# Patient Record
(demographics unavailable — no encounter records)

---

## 2025-07-19 NOTE — PHYSICAL EXAM
[MA] : MA [FreeTextEntry2] : Kishore [Appropriately responsive] : appropriately responsive [Alert] : alert [No Acute Distress] : no acute distress [No Lymphadenopathy] : no lymphadenopathy [Soft] : soft [Non-tender] : non-tender [Non-distended] : non-distended [Oriented x3] : oriented x3 [Examination Of The Breasts] : a normal appearance [No Masses] : no breast masses were palpable [Labia Majora] : normal [Labia Minora] : normal [Normal] : normal [Uterine Adnexae] : normal

## 2025-07-19 NOTE — HISTORY OF PRESENT ILLNESS
[FreeTextEntry1] : CC: Annual checkup. No c/o. HPI: LMP:    POBGYN:    x 1 H/o Mild cervical dysplasia. Pap 2019 HPV HR+   PAP: [id number] Mammo:

## 2025-07-30 NOTE — PLAN
[FreeTextEntry1] : Abnl pap, HPV infection d/w pt.  No AW lesion on cx on colposcopy Will call pt w/ ECC result. Pap/cotesting in 1 yr if ECC neg.

## 2025-07-30 NOTE — PROCEDURE
[Colposcopy] : Colposcopy  [Risks] : risks [Patient] : patient [Abnormal Pap] : abnormal pap [HPV High Risk] : HPV high risk [No Premedication] : no premedication [Colposcopy Adequate] : colposcopy adequate [Pap Performed] : pap not performed [SCI Fully Visualized] : SCI fully visualized [ECC Performed] : ECC performed [No Abnormalities] : no abnormalities [Biopsy] : biopsy taken [Hemostasis Obtained] : Hemostasis obtained [Tolerated Well] : the patient tolerated the procedure well [de-identified] : 1 [de-identified] : ecc

## 2025-07-30 NOTE — PROCEDURE
[Colposcopy] : Colposcopy  [Risks] : risks [Patient] : patient [Abnormal Pap] : abnormal pap [HPV High Risk] : HPV high risk [No Premedication] : no premedication [Colposcopy Adequate] : colposcopy adequate [Pap Performed] : pap not performed [SCI Fully Visualized] : SCI fully visualized [ECC Performed] : ECC performed [No Abnormalities] : no abnormalities [Biopsy] : biopsy taken [Hemostasis Obtained] : Hemostasis obtained [Tolerated Well] : the patient tolerated the procedure well [de-identified] : 1 [de-identified] : ecc

## 2025-07-30 NOTE — HISTORY OF PRESENT ILLNESS
[FreeTextEntry1] : CC: GYN F/U Pt is here to discuss pap smear result and for colposcopy.  HPI: LMP: 2020  POBGYN:   x 1 H/o Mild cervical dysplasia. Pap 2019 HPV HR+  PAP: 2025 ASCUS HPV POS Mammo: